# Patient Record
Sex: MALE | Race: BLACK OR AFRICAN AMERICAN
[De-identification: names, ages, dates, MRNs, and addresses within clinical notes are randomized per-mention and may not be internally consistent; named-entity substitution may affect disease eponyms.]

---

## 2023-07-17 ENCOUNTER — HOSPITAL ENCOUNTER (OUTPATIENT)
Dept: HOSPITAL 76 - LAB.N | Age: 63
Discharge: HOME | End: 2023-07-17
Attending: PHYSICIAN ASSISTANT
Payer: MEDICAID

## 2023-07-17 ENCOUNTER — HOSPITAL ENCOUNTER (OUTPATIENT)
Dept: HOSPITAL 76 - DI | Age: 63
Discharge: HOME | End: 2023-07-17
Attending: PHYSICIAN ASSISTANT
Payer: MEDICAID

## 2023-07-17 DIAGNOSIS — M17.12: Primary | ICD-10-CM

## 2023-07-17 PROCEDURE — 84550 ASSAY OF BLOOD/URIC ACID: CPT

## 2023-07-17 PROCEDURE — 36415 COLL VENOUS BLD VENIPUNCTURE: CPT

## 2023-07-17 NOTE — XRAY REPORT
PROCEDURE:  Knee 3 View LT

 

INDICATIONS:  OSTEOARTHRITIS, KNEE ,LEFT, MILD

 

TECHNIQUE:  3 views of the left knee(s) were acquired.  

 

COMPARISON:  None.

 

FINDINGS:  

 

Bones:  No fractures or dislocations.  No suspicious bony lesions. Question bipartite patella. Severe
 tricompartmental knee joint degeneration, most pronounced in the medial femorotibial compartment  

 

Soft tissues:  Trace knee joint effusion. No suspicious soft tissue calcifications or masses.  Suspec
t intra-articular bodies in the suprapatellar knee joint.

 

 

IMPRESSION:  

 

 

1. Severe osteoarthritic changes.

2. Question an intra-articular bodies in the suprapatellar knee joint.

 

 

Reviewed by: Davon Pickering MD on 7/17/2023 4:37 PM PDT

Approved by: Davon Pickering MD on 7/17/2023 4:37 PM PDT

 

 

Station ID:  [id number]